# Patient Record
Sex: MALE | Race: WHITE | NOT HISPANIC OR LATINO | Employment: UNEMPLOYED | URBAN - METROPOLITAN AREA
[De-identification: names, ages, dates, MRNs, and addresses within clinical notes are randomized per-mention and may not be internally consistent; named-entity substitution may affect disease eponyms.]

---

## 2022-04-10 ENCOUNTER — HISTORICAL (OUTPATIENT)
Dept: ADMINISTRATIVE | Facility: HOSPITAL | Age: 27
End: 2022-04-10
Payer: MEDICAID

## 2022-04-20 ENCOUNTER — HOSPITAL ENCOUNTER (EMERGENCY)
Facility: OTHER | Age: 27
Discharge: HOME | End: 2022-04-20
Attending: EMERGENCY MEDICINE
Payer: MEDICAID

## 2022-04-20 VITALS
OXYGEN SATURATION: 98 % | DIASTOLIC BLOOD PRESSURE: 80 MMHG | HEIGHT: 66 IN | SYSTOLIC BLOOD PRESSURE: 154 MMHG | WEIGHT: 150 LBS | TEMPERATURE: 98 F | RESPIRATION RATE: 18 BRPM | HEART RATE: 91 BPM | BODY MASS INDEX: 24.11 KG/M2

## 2022-04-20 DIAGNOSIS — R10.9 ABDOMINAL PAIN, UNSPECIFIED ABDOMINAL LOCATION: Primary | ICD-10-CM

## 2022-04-20 DIAGNOSIS — R19.7 DIARRHEA, UNSPECIFIED TYPE: ICD-10-CM

## 2022-04-20 LAB
ALBUMIN SERPL BCP-MCNC: 5.1 G/DL (ref 3.5–5.2)
ALP SERPL-CCNC: 82 U/L (ref 55–135)
ALT SERPL W/O P-5'-P-CCNC: 28 U/L (ref 10–44)
ANION GAP SERPL CALC-SCNC: 15 MMOL/L (ref 8–16)
AST SERPL-CCNC: 21 U/L (ref 10–40)
BASOPHILS # BLD AUTO: 0.05 K/UL (ref 0–0.2)
BASOPHILS NFR BLD: 0.6 % (ref 0–1.9)
BILIRUB SERPL-MCNC: 1.2 MG/DL (ref 0.1–1)
BUN SERPL-MCNC: 8 MG/DL (ref 6–20)
CALCIUM SERPL-MCNC: 10.2 MG/DL (ref 8.7–10.5)
CHLORIDE SERPL-SCNC: 103 MMOL/L (ref 95–110)
CO2 SERPL-SCNC: 23 MMOL/L (ref 23–29)
CREAT SERPL-MCNC: 0.8 MG/DL (ref 0.5–1.4)
DIFFERENTIAL METHOD: ABNORMAL
EOSINOPHIL # BLD AUTO: 0.3 K/UL (ref 0–0.5)
EOSINOPHIL NFR BLD: 3.6 % (ref 0–8)
ERYTHROCYTE [DISTWIDTH] IN BLOOD BY AUTOMATED COUNT: 12.1 % (ref 11.5–14.5)
EST. GFR  (AFRICAN AMERICAN): >60 ML/MIN/1.73 M^2
EST. GFR  (NON AFRICAN AMERICAN): >60 ML/MIN/1.73 M^2
GLUCOSE SERPL-MCNC: 87 MG/DL (ref 70–110)
HCT VFR BLD AUTO: 49.6 % (ref 40–54)
HCV AB SERPL QL IA: NEGATIVE
HGB BLD-MCNC: 17.1 G/DL (ref 14–18)
HIV 1+2 AB+HIV1 P24 AG SERPL QL IA: NEGATIVE
IMM GRANULOCYTES # BLD AUTO: 0.03 K/UL (ref 0–0.04)
IMM GRANULOCYTES NFR BLD AUTO: 0.3 % (ref 0–0.5)
LIPASE SERPL-CCNC: 13 U/L (ref 4–60)
LYMPHOCYTES # BLD AUTO: 2 K/UL (ref 1–4.8)
LYMPHOCYTES NFR BLD: 22.4 % (ref 18–48)
MCH RBC QN AUTO: 31.4 PG (ref 27–31)
MCHC RBC AUTO-ENTMCNC: 34.5 G/DL (ref 32–36)
MCV RBC AUTO: 91 FL (ref 82–98)
MONOCYTES # BLD AUTO: 0.7 K/UL (ref 0.3–1)
MONOCYTES NFR BLD: 7.2 % (ref 4–15)
NEUTROPHILS # BLD AUTO: 5.9 K/UL (ref 1.8–7.7)
NEUTROPHILS NFR BLD: 65.9 % (ref 38–73)
NRBC BLD-RTO: 0 /100 WBC
PLATELET # BLD AUTO: 279 K/UL (ref 150–450)
PMV BLD AUTO: 9.1 FL (ref 9.2–12.9)
POTASSIUM SERPL-SCNC: 4.3 MMOL/L (ref 3.5–5.1)
PROT SERPL-MCNC: 7.6 G/DL (ref 6–8.4)
RBC # BLD AUTO: 5.44 M/UL (ref 4.6–6.2)
SODIUM SERPL-SCNC: 141 MMOL/L (ref 136–145)
WBC # BLD AUTO: 9.01 K/UL (ref 3.9–12.7)

## 2022-04-20 PROCEDURE — 85025 COMPLETE CBC W/AUTO DIFF WBC: CPT | Performed by: PHYSICIAN ASSISTANT

## 2022-04-20 PROCEDURE — 80053 COMPREHEN METABOLIC PANEL: CPT | Performed by: PHYSICIAN ASSISTANT

## 2022-04-20 PROCEDURE — 86803 HEPATITIS C AB TEST: CPT | Performed by: EMERGENCY MEDICINE

## 2022-04-20 PROCEDURE — 99283 EMERGENCY DEPT VISIT LOW MDM: CPT | Mod: 25

## 2022-04-20 PROCEDURE — 25000003 PHARM REV CODE 250: Performed by: EMERGENCY MEDICINE

## 2022-04-20 PROCEDURE — 83690 ASSAY OF LIPASE: CPT | Performed by: PHYSICIAN ASSISTANT

## 2022-04-20 PROCEDURE — 87389 HIV-1 AG W/HIV-1&-2 AB AG IA: CPT | Performed by: EMERGENCY MEDICINE

## 2022-04-20 RX ORDER — MAG HYDROX/ALUMINUM HYD/SIMETH 200-200-20
30 SUSPENSION, ORAL (FINAL DOSE FORM) ORAL ONCE
Status: COMPLETED | OUTPATIENT
Start: 2022-04-20 | End: 2022-04-20

## 2022-04-20 RX ORDER — LIDOCAINE HYDROCHLORIDE 20 MG/ML
10 SOLUTION OROPHARYNGEAL ONCE
Status: COMPLETED | OUTPATIENT
Start: 2022-04-20 | End: 2022-04-20

## 2022-04-20 RX ORDER — METHYLPREDNISOLONE 4 MG/1
TABLET ORAL
COMMUNITY
Start: 2021-11-03

## 2022-04-20 RX ORDER — BENZONATATE 100 MG/1
100 CAPSULE ORAL 3 TIMES DAILY
COMMUNITY
Start: 2021-11-15

## 2022-04-20 RX ORDER — FLUTICASONE PROPIONATE 50 MCG
SPRAY, SUSPENSION (ML) NASAL
COMMUNITY
Start: 2021-12-12

## 2022-04-20 RX ORDER — BUDESONIDE AND FORMOTEROL FUMARATE DIHYDRATE 160; 4.5 UG/1; UG/1
AEROSOL RESPIRATORY (INHALATION)
COMMUNITY
Start: 2022-03-05

## 2022-04-20 RX ORDER — AZITHROMYCIN 250 MG/1
TABLET, FILM COATED ORAL
COMMUNITY
Start: 2021-11-15

## 2022-04-20 RX ORDER — ALBUTEROL SULFATE 90 UG/1
2 AEROSOL, METERED RESPIRATORY (INHALATION)
COMMUNITY
Start: 2021-11-03

## 2022-04-20 RX ADMIN — ALUMINUM HYDROXIDE, MAGNESIUM HYDROXIDE, AND SIMETHICONE 30 ML: 200; 200; 20 SUSPENSION ORAL at 07:04

## 2022-04-20 RX ADMIN — LIDOCAINE HYDROCHLORIDE 10 ML: 20 SOLUTION ORAL; TOPICAL at 07:04

## 2022-04-20 NOTE — ED PROVIDER NOTES
"     Source of History:  The patient.    Chief complaint:  Abdominal Pain (Pt presents to the ER with complaints of mid upper abdominal pain radiating into the middle of the back since Sunday. Pt also reporting yellow stools./)      HPI:  Karthik Webster Jr. is a 27 y.o. male presenting with abdominal pain onset 3 days ago. The patient reports his pain began on the left side and radiated bilaterally and to his back. He describes the pain as tender and uncomfortable to touch. He reports the pain is exacerbated with movement or when he is full. The patient states he has also been experiencing diarrhea, which was yellow in color yesterday but has since improved. He states he has a normal appetite, and denies nausea, vomiting, or dysuria. Of note patient reports no one else in his family is experiencing similar symptoms.    This is the extent to the patients complaints today here in the emergency department.    ROS: As per HPI and below:  Constitutional: No fever.  No chills.  Eyes: No visual changes.  ENT: No sore throat. No ear pain    Cardiovascular: No chest pain.  Respiratory: No shortness of breath.  GI: Positive for abdominal pain. Positive for diarrhea. No nausea. No vomiting.   Genitourinary: No abnormal urination.  Neurologic: No headache. No focal weakness.  No numbness.  MSK: Positive for back pain.  Integument: No rashes or lesions.  Hematologic: No easy bruising.  Endocrine: No excessive thirst or urination.    Review of patient's allergies indicates:  No Known Allergies    PMH:  As per HPI and below:  History reviewed. No pertinent past medical history.  History reviewed. No pertinent surgical history.         Physical Exam:    BP (!) 159/83 (BP Location: Left arm, Patient Position: Sitting)   Pulse 93   Temp 97.6 °F (36.4 °C) (Oral)   Resp 18   Ht 5' 6" (1.676 m)   Wt 68 kg (150 lb)   SpO2 98%   BMI 24.21 kg/m²   Nursing note and vital signs reviewed.  Constitutional: No acute distress.  " Nontoxic  Eyes: No conjunctival injection.  Extraocular muscles are intact.  ENT: Oropharynx clear.  Normal phonation.  Cardiovascular: Regular rate and rhythm.  No murmurs. No gallops. No rubs  Respiratory: Clear to auscultation bilaterally.  Good air movement.  No wheezes.  No rhonchi. No rales. No accessory muscle use.  Abdomen:   Soft, nontender and nondistended.  No guarding or rebound.  Negative Wick sign.  No McBurney point tenderness.  Non peritoneal.  Musculoskeletal: Good range of motion all joints.  No deformities.  Neck supple.  No meningismus.  Skin: No rashes seen.  Good turgor.  No abrasions.  No ecchymoses.  Neuro: alert and oriented x3,  no focal neurological deficits.  Psych: Appropriate, conversant    Labs that have been ordered have been independently reviewed and interpreted by myself.    I decided to obtain the patient's medical records.  Summary of Medical Records:      MDM/ Differential Dx:   27 y.o. male with  Abdominal pain and some diarrhea.  His abdominal exam is benign.  No evidence of cholecystitis, appendicitis or diverticulitis.  His vital signs are normal.  I do not suspect any urinary symptoms such as renal colic or pyelonephritis.  Blood work was protocol and after review shows no significant abnormality.  Possible gastritis versus viral illness as we have been seeing a significant amount.  There is no evidence on my examination to necessitate imaging at this time.    ED Course as of 04/20/22 1923 Wed Apr 20, 2022 1841 Lipase: 13 [SM]   1841 Creatinine: 0.8 [SM]   1841 Sodium: 141 [SM]   1841 Potassium: 4.3 [SM]   1841 WBC: 9.01 [SM]   1841 Hemoglobin: 17.1 [SM]   1916   Discussed results with patient.  Do not feel any further workup is indicated at this time.  Based on examination and re-evaluation do not suspect an acute abdomen do not feel imaging is indicated.  Recommend another 24-48 hours observation at home.  I discussed this with the patient as well as his sister who  is at bedside.  They agree   With the plan of care.      Patient to be discharged home in stable condition. Diagnosis and treatment plan explained to patient and/or family member who is at bedside. I have answered all questions and the patient is satisfied with the plan of care. Strict return precautions given. The patient demonstrates understanding of the care plan. []   1917   I did discharge the patient home with care instructions from [facility] for abdominal pain. [SM]      ED Course User Index  [] Bo Pappas DO              Scribe Attestation:   Scribe #1: I performed the above scribed service and the documentation accurately describes the services I performed. I attest to the accuracy of the note.    Physician Attestation for Scribe: I,   Dr Bo Pappas, reviewed documentation as scribed in my presence, which is both accurate and complete.  Diagnostic Impression:    1. Abdominal pain, unspecified abdominal location    2. Diarrhea, unspecified type         ED Disposition Condition    Discharge Stable          ED Prescriptions     None        Follow-up Information     Follow up With Specialties Details Why Contact Info    [facility] - Emergency Dept Emergency Medicine In 1 day If symptoms worsen [address] [facility] [address]  [phone]           Bo Pappas,   04/20/22 1923

## 2022-04-20 NOTE — ED TRIAGE NOTES
Chief Complaint   Patient presents with    Abdominal Pain     Pt presents to the ER with complaints of mid upper abdominal pain radiating into the middle of the back since Sunday. Pt also reporting yellow stools.       Pt presents to ED with complaints to epigastric pain radiating into middle of back since Sunday. Pt reports loose yellow stool, denies urinary symptoms. Pt states he drank more alcohol than he usually does this past weekend. AAOX4 and in no acute distress.

## 2022-04-20 NOTE — FIRST PROVIDER EVALUATION
Emergency Department TeleTriage Encounter Note      CHIEF COMPLAINT    Chief Complaint   Patient presents with    Abdominal Pain     Pt presents to the ER with complaints of mid upper abdominal pain radiating into the middle of the back since Sunday. Pt also reporting yellow stools.         VITAL SIGNS   Initial Vitals [04/20/22 1728]   BP Pulse Resp Temp SpO2   (!) 159/83 93 18 97.6 °F (36.4 °C) 98 %      MAP       --            ALLERGIES    Review of patient's allergies indicates:  No Known Allergies    PROVIDER TRIAGE NOTE  Patient is a 27-year-old male who presents with upper abdominal pain radiating to the back.  He reports pain is worse with eating.  He has fasted since yesterday which has mildly improved his symptoms.  He reports yellow stools.  He drinks a couple at night.  He is referring to beer.    Initial orders will be placed and care will be transferred to an alternate provider when patient is roomed for a full evaluation. Any additional orders and the final disposition will be determined by that provider.        ORDERS  Labs Reviewed   HIV 1 / 2 ANTIBODY   HEPATITIS C ANTIBODY   CBC W/ AUTO DIFFERENTIAL   COMPREHENSIVE METABOLIC PANEL   LIPASE   URINALYSIS, REFLEX TO URINE CULTURE       ED Orders (720h ago, onward)    Start Ordered     Status Ordering Provider    04/20/22 1737 04/20/22 1736  Vital signs  Every 2 hours         Ordered NEGROTTO YARON GAUTAM    04/20/22 1737 04/20/22 1736  Diet NPO  Diet effective now         Ordered NEGROTTO YARON GAUTAM    04/20/22 1737 04/20/22 1736  Insert peripheral IV  Once         Ordered NEGROTTO YARON GAUTAM    04/20/22 1737 04/20/22 1736  CBC W/ AUTO DIFFERENTIAL  STAT         Ordered NEGROTTO YARON GAUTAM    04/20/22 1737 04/20/22 1736  Comp. Metabolic Panel  STAT         Ordered NEGROTTO DAINYARON COREAS    04/20/22 1737 04/20/22 1736  Lipase  STAT         Ordered NEGROTYARON TENA    04/20/22 1737 04/20/22 1736   Urinalysis, Reflex to Urine Culture Urine, Clean Catch  STAT         Ordered NEGROTTO YARON GAUTAM    04/20/22 1730 04/20/22 1733  HIV 1/2 Ag/Ab (4th Gen)  STAT         Ordered MALINI VILLANUEVA    04/20/22 1730 04/20/22 1733  Hepatitis C Antibody  STAT         Ordered KAYPELON GILESEW DEVIN            Virtual Visit Note: The provider triage portion of this emergency department evaluation and documentation was performed via [employer], a HIPAA-compliant telemedicine application, in concert with a tele-presenter in the room. A face to face patient evaluation with one of my colleagues will occur once the patient is placed in an emergency department room.      DISCLAIMER: This note was prepared with Taplet voice recognition transcription software. Garbled syntax, mangled pronouns, and other bizarre constructions may be attributed to that software system.

## 2022-04-21 NOTE — PROVIDER PROGRESS NOTES - EMERGENCY DEPT.
Encounter Date: 4/20/2022    ED Physician Progress Notes        Physician Note:   I called the patient is a follow-up.  Stated the abdominal pain is getting better however he has noticed a rash on the left side of his abdomen wrapping around to his back.  Does not cross the midline.  Based on weight describes most likely shingles.:  Prescription to [facility] [address] for prednisone 60 mg p.o. daily for 5 days.  Valacyclovir 1 g q.8 hours for 7 days.

## 2022-04-30 VITALS
SYSTOLIC BLOOD PRESSURE: 130 MMHG | WEIGHT: 127 LBS | HEIGHT: 66 IN | BODY MASS INDEX: 20.41 KG/M2 | OXYGEN SATURATION: 99 % | DIASTOLIC BLOOD PRESSURE: 82 MMHG

## 2022-09-09 ENCOUNTER — HOSPITAL ENCOUNTER (EMERGENCY)
Facility: OTHER | Age: 27
Discharge: HOME | End: 2022-09-09
Attending: EMERGENCY MEDICINE
Payer: MEDICAID

## 2022-09-09 VITALS
WEIGHT: 150 LBS | BODY MASS INDEX: 24.11 KG/M2 | SYSTOLIC BLOOD PRESSURE: 129 MMHG | OXYGEN SATURATION: 99 % | HEART RATE: 84 BPM | TEMPERATURE: 98 F | DIASTOLIC BLOOD PRESSURE: 79 MMHG | RESPIRATION RATE: 20 BRPM | HEIGHT: 66 IN

## 2022-09-09 DIAGNOSIS — M79.662 PAIN OF LEFT CALF: Primary | ICD-10-CM

## 2022-09-09 DIAGNOSIS — M79.669 CALF PAIN: ICD-10-CM

## 2022-09-09 PROCEDURE — 99284 EMERGENCY DEPT VISIT MOD MDM: CPT | Mod: 25

## 2022-09-09 NOTE — ED PROVIDER NOTES
"Encounter Date: 9/9/2022    SCRIBE #1 NOTE: I, Sho Vazquez, am scribing for, and in the presence of,  Willy Donohue II, MD.     History     Chief Complaint   Patient presents with    Leg Pain     Left calf pain x1 week. Intermittent in nature. No obvious deformities/trauma or swelling noted.      Time seen by provider: 4:22 PM    This is a 27 y.o. male, with a PMHx of asthma, who presents to the ED with complaint of left calf pain for the past 1 week. Patient states the pain waxes and wanes in intensity. He denies any known inciting factors. Patient reports taking Ibuprofen with no relief. He notes he woke up with "cold feet" this AM. Patient denies chest pain or shortness of breath. He does not have a Hx of blood clots. This is the extent of the patient's complaints at this time.    The history is provided by the patient.   Review of patient's allergies indicates:  No Known Allergies  History reviewed. No pertinent past medical history.  No past surgical history on file.  No family history on file.     Review of Systems   Constitutional:  Negative for chills and fever.   HENT:  Negative for congestion, rhinorrhea and sore throat.    Eyes:  Negative for visual disturbance.   Respiratory:  Negative for cough and shortness of breath.    Cardiovascular:  Negative for chest pain.   Gastrointestinal:  Negative for abdominal pain, diarrhea, nausea and vomiting.   Genitourinary:  Negative for dysuria, frequency and hematuria.   Musculoskeletal:  Positive for myalgias (left calf). Negative for back pain.   Skin:  Negative for rash.   Neurological:  Negative for dizziness, weakness and headaches.     Physical Exam     Initial Vitals [09/09/22 1444]   BP Pulse Resp Temp SpO2   (!) 165/85 100 20 97.8 °F (36.6 °C) 99 %      MAP       --         Physical Exam    Nursing note and vitals reviewed.  Constitutional: He appears well-developed and well-nourished. He is not diaphoretic. No distress.   HENT:   Head: " Normocephalic and atraumatic.   Eyes: Conjunctivae and EOM are normal. Pupils are equal, round, and reactive to light.   Neck: Neck supple.   Normal range of motion.  Abdominal: Abdomen is soft. There is no abdominal tenderness.   Musculoskeletal:         General: No tenderness. Normal range of motion.      Cervical back: Normal range of motion and neck supple.      Comments: No peripheral edema. No calf asymmetry or palpable cords. No focal bony tenderness to the knee or ankle.      Neurological: He is alert and oriented to person, place, and time. He has normal strength.   Skin: Skin is warm and dry.   No skin changes.    Psychiatric: He has a normal mood and affect. His behavior is normal. Judgment and thought content normal.       ED Course   Procedures  Labs Reviewed - No data to display       Imaging Results              US Lower Extremity Veins Left (Final result)  Result time 09/09/22 15:42:51      Final result by Linwood Clayton MD (09/09/22 15:42:51)                   Impression:      No evidence of deep venous thrombosis in the left lower extremity.      Electronically signed by: Linwood Clayton MD  Date:    09/09/2022  Time:    15:42               Narrative:    EXAMINATION:  US LOWER EXTREMITY VEINS LEFT    CLINICAL HISTORY:  Pain in unspecified lower leg    TECHNIQUE:  Duplex and color flow Doppler evaluation and graded compression of the left lower extremity veins was performed.    COMPARISON:  None    FINDINGS:  Left thigh veins: The common femoral, femoral, popliteal, upper greater saphenous, and deep femoral veins are patent and free of thrombus. The veins are normally compressible and have normal phasic flow and augmentation response.    Left calf veins: The visualized calf veins are patent.    Contralateral CFV: The contralateral (right) common femoral vein is patent and free of thrombus.    Miscellaneous: None                                       Medications - No data to display  Medical Decision  Making:   History:   Old Medical Records: I decided to obtain old medical records.  Clinical Tests:   Radiological Study: Ordered and Reviewed     Presents with atraumatic left calf pain for the past week.  No immobility or trauma.  No history of DVT.  Does not smoke.  On exam he does not have any current calf tenderness.  There is no asymmetry.  He does not have any erythema or warmth to suggest cellulitis.  Ultrasound is negative for DVT.  He does not have any bony injury to warrant plain film imaging.  Recommend over-the-counter analgesics.  Return precautions discussed.       Scribe Attestation:   Scribe #1: I performed the above scribed service and the documentation accurately describes the services I performed. I attest to the accuracy of the note.           Physician Attestation for Scribe: I, TL, reviewed documentation as scribed in my presence, which is both accurate and complete.    Clinical Impression:   Final diagnoses:  [M79.669] Calf pain  [M79.662] Pain of left calf (Primary)        ED Disposition Condition    Discharge Stable          ED Prescriptions    None       Follow-up Information       Follow up With Specialties Details Why Contact Info    [facility]  Schedule an appointment as soon as possible for a visit                Willy Donohue II, MD  09/10/22 [id number]

## 2022-09-09 NOTE — FIRST PROVIDER EVALUATION
"Medical screening examination initiated.  I have conducted a focused provider triage encounter, findings are as follows:    Brief history of present illness:  left calf pain x 3 days, no cp or sob.  No recent long ride or trauma    Vitals:    09/09/22 1444   BP: (!) 165/85   Pulse: 100   Resp: 20   Temp: 97.8 °F (36.6 °C)   TempSrc: Oral   SpO2: 99%   Weight: 68 kg (150 lb)   Height: 5' 6" (1.676 m)       Pertinent physical exam:  well appearing    Brief workup plan:  US    Preliminary workup initiated; this workup will be continued and followed by the physician or advanced practice provider that is assigned to the patient when roomed.  "